# Patient Record
Sex: MALE | Race: OTHER | HISPANIC OR LATINO | URBAN - METROPOLITAN AREA
[De-identification: names, ages, dates, MRNs, and addresses within clinical notes are randomized per-mention and may not be internally consistent; named-entity substitution may affect disease eponyms.]

---

## 2023-03-30 RX ORDER — CHLORHEXIDINE GLUCONATE 213 G/1000ML
1 SOLUTION TOPICAL ONCE
Refills: 0 | Status: DISCONTINUED | OUTPATIENT
Start: 2023-03-31 | End: 2023-04-01

## 2023-03-31 ENCOUNTER — INPATIENT (INPATIENT)
Facility: HOSPITAL | Age: 53
LOS: 0 days | Discharge: HOME | DRG: 247 | End: 2023-04-01
Attending: INTERNAL MEDICINE | Admitting: INTERNAL MEDICINE
Payer: COMMERCIAL

## 2023-03-31 VITALS
TEMPERATURE: 98 F | DIASTOLIC BLOOD PRESSURE: 83 MMHG | OXYGEN SATURATION: 97 % | RESPIRATION RATE: 15 BRPM | SYSTOLIC BLOOD PRESSURE: 125 MMHG | HEART RATE: 65 BPM

## 2023-03-31 DIAGNOSIS — R06.02 SHORTNESS OF BREATH: ICD-10-CM

## 2023-03-31 LAB
ANION GAP SERPL CALC-SCNC: 11 MMOL/L — SIGNIFICANT CHANGE UP (ref 5–17)
BASOPHILS # BLD AUTO: 0.05 K/UL — SIGNIFICANT CHANGE UP (ref 0–0.2)
BASOPHILS NFR BLD AUTO: 1 % — SIGNIFICANT CHANGE UP (ref 0–2)
BUN SERPL-MCNC: 19.4 MG/DL — SIGNIFICANT CHANGE UP (ref 8–20)
CALCIUM SERPL-MCNC: 9.2 MG/DL — SIGNIFICANT CHANGE UP (ref 8.4–10.5)
CHLORIDE SERPL-SCNC: 101 MMOL/L — SIGNIFICANT CHANGE UP (ref 96–108)
CHOLEST SERPL-MCNC: 307 MG/DL — HIGH
CO2 SERPL-SCNC: 24 MMOL/L — SIGNIFICANT CHANGE UP (ref 22–29)
CREAT SERPL-MCNC: 0.94 MG/DL — SIGNIFICANT CHANGE UP (ref 0.5–1.3)
EGFR: 96 ML/MIN/1.73M2 — SIGNIFICANT CHANGE UP
EOSINOPHIL # BLD AUTO: 0.08 K/UL — SIGNIFICANT CHANGE UP (ref 0–0.5)
EOSINOPHIL NFR BLD AUTO: 1.7 % — SIGNIFICANT CHANGE UP (ref 0–6)
GLUCOSE SERPL-MCNC: 233 MG/DL — HIGH (ref 70–99)
HCT VFR BLD CALC: 43.6 % — SIGNIFICANT CHANGE UP (ref 39–50)
HDLC SERPL-MCNC: 30 MG/DL — LOW
HGB BLD-MCNC: 14.8 G/DL — SIGNIFICANT CHANGE UP (ref 13–17)
IMM GRANULOCYTES NFR BLD AUTO: 1.5 % — HIGH (ref 0–0.9)
LIPID PNL WITH DIRECT LDL SERPL: SIGNIFICANT CHANGE UP MG/DL
LYMPHOCYTES # BLD AUTO: 2 K/UL — SIGNIFICANT CHANGE UP (ref 1–3.3)
LYMPHOCYTES # BLD AUTO: 41.8 % — SIGNIFICANT CHANGE UP (ref 13–44)
MAGNESIUM SERPL-MCNC: 1.7 MG/DL — SIGNIFICANT CHANGE UP (ref 1.6–2.6)
MCHC RBC-ENTMCNC: 28.5 PG — SIGNIFICANT CHANGE UP (ref 27–34)
MCHC RBC-ENTMCNC: 33.9 GM/DL — SIGNIFICANT CHANGE UP (ref 32–36)
MCV RBC AUTO: 83.8 FL — SIGNIFICANT CHANGE UP (ref 80–100)
MONOCYTES # BLD AUTO: 0.4 K/UL — SIGNIFICANT CHANGE UP (ref 0–0.9)
MONOCYTES NFR BLD AUTO: 8.4 % — SIGNIFICANT CHANGE UP (ref 2–14)
NEUTROPHILS # BLD AUTO: 2.18 K/UL — SIGNIFICANT CHANGE UP (ref 1.8–7.4)
NEUTROPHILS NFR BLD AUTO: 45.6 % — SIGNIFICANT CHANGE UP (ref 43–77)
NON HDL CHOLESTEROL: 277 MG/DL — HIGH
PLATELET # BLD AUTO: 135 K/UL — LOW (ref 150–400)
POTASSIUM SERPL-MCNC: 5 MMOL/L — SIGNIFICANT CHANGE UP (ref 3.5–5.3)
POTASSIUM SERPL-SCNC: 5 MMOL/L — SIGNIFICANT CHANGE UP (ref 3.5–5.3)
RBC # BLD: 5.2 M/UL — SIGNIFICANT CHANGE UP (ref 4.2–5.8)
RBC # FLD: 12.9 % — SIGNIFICANT CHANGE UP (ref 10.3–14.5)
SODIUM SERPL-SCNC: 136 MMOL/L — SIGNIFICANT CHANGE UP (ref 135–145)
TRIGL SERPL-MCNC: 785 MG/DL — HIGH
WBC # BLD: 4.78 K/UL — SIGNIFICANT CHANGE UP (ref 3.8–10.5)
WBC # FLD AUTO: 4.78 K/UL — SIGNIFICANT CHANGE UP (ref 3.8–10.5)

## 2023-03-31 PROCEDURE — 93010 ELECTROCARDIOGRAM REPORT: CPT

## 2023-03-31 RX ORDER — ASPIRIN/CALCIUM CARB/MAGNESIUM 324 MG
81 TABLET ORAL DAILY
Refills: 0 | Status: DISCONTINUED | OUTPATIENT
Start: 2023-04-01 | End: 2023-04-01

## 2023-03-31 RX ORDER — SACUBITRIL AND VALSARTAN 24; 26 MG/1; MG/1
1 TABLET, FILM COATED ORAL
Refills: 0 | Status: DISCONTINUED | OUTPATIENT
Start: 2023-03-31 | End: 2023-03-31

## 2023-03-31 RX ORDER — ATORVASTATIN CALCIUM 80 MG/1
40 TABLET, FILM COATED ORAL AT BEDTIME
Refills: 0 | Status: DISCONTINUED | OUTPATIENT
Start: 2023-03-31 | End: 2023-03-31

## 2023-03-31 RX ORDER — MAGNESIUM OXIDE 400 MG ORAL TABLET 241.3 MG
400 TABLET ORAL ONCE
Refills: 0 | Status: COMPLETED | OUTPATIENT
Start: 2023-03-31 | End: 2023-03-31

## 2023-03-31 RX ORDER — TICAGRELOR 90 MG/1
90 TABLET ORAL EVERY 12 HOURS
Refills: 0 | Status: DISCONTINUED | OUTPATIENT
Start: 2023-03-31 | End: 2023-04-01

## 2023-03-31 RX ORDER — FENOFIBRATE,MICRONIZED 130 MG
145 CAPSULE ORAL DAILY
Refills: 0 | Status: DISCONTINUED | OUTPATIENT
Start: 2023-03-31 | End: 2023-03-31

## 2023-03-31 RX ORDER — SACUBITRIL AND VALSARTAN 24; 26 MG/1; MG/1
1 TABLET, FILM COATED ORAL
Qty: 180 | Refills: 3
Start: 2023-03-31 | End: 2024-03-24

## 2023-03-31 RX ORDER — OMEGA-3 ACID ETHYL ESTERS 1 G
2 CAPSULE ORAL
Refills: 0 | DISCHARGE

## 2023-03-31 RX ORDER — TICAGRELOR 90 MG/1
1 TABLET ORAL
Qty: 180 | Refills: 3
Start: 2023-03-31 | End: 2024-03-24

## 2023-03-31 RX ORDER — ASPIRIN/CALCIUM CARB/MAGNESIUM 324 MG
1 TABLET ORAL
Refills: 0 | DISCHARGE

## 2023-03-31 RX ORDER — FENOFIBRATE,MICRONIZED 130 MG
48 CAPSULE ORAL DAILY
Refills: 0 | Status: DISCONTINUED | OUTPATIENT
Start: 2023-04-01 | End: 2023-04-01

## 2023-03-31 RX ORDER — TICAGRELOR 90 MG/1
1 TABLET ORAL
Qty: 90 | Refills: 3
Start: 2023-03-31

## 2023-03-31 RX ORDER — CARVEDILOL PHOSPHATE 80 MG/1
3.12 CAPSULE, EXTENDED RELEASE ORAL EVERY 12 HOURS
Refills: 0 | Status: DISCONTINUED | OUTPATIENT
Start: 2023-03-31 | End: 2023-04-01

## 2023-03-31 RX ORDER — SACUBITRIL AND VALSARTAN 24; 26 MG/1; MG/1
1 TABLET, FILM COATED ORAL
Qty: 90 | Refills: 3
Start: 2023-03-31

## 2023-03-31 RX ORDER — SACUBITRIL AND VALSARTAN 24; 26 MG/1; MG/1
1 TABLET, FILM COATED ORAL
Refills: 0 | Status: DISCONTINUED | OUTPATIENT
Start: 2023-03-31 | End: 2023-04-01

## 2023-03-31 RX ORDER — ATORVASTATIN CALCIUM 80 MG/1
80 TABLET, FILM COATED ORAL AT BEDTIME
Refills: 0 | Status: DISCONTINUED | OUTPATIENT
Start: 2023-03-31 | End: 2023-03-31

## 2023-03-31 RX ORDER — ROSUVASTATIN CALCIUM 5 MG/1
40 TABLET ORAL AT BEDTIME
Refills: 0 | Status: DISCONTINUED | OUTPATIENT
Start: 2023-03-31 | End: 2023-04-01

## 2023-03-31 RX ADMIN — Medication 145 MILLIGRAM(S): at 16:13

## 2023-03-31 RX ADMIN — CARVEDILOL PHOSPHATE 3.12 MILLIGRAM(S): 80 CAPSULE, EXTENDED RELEASE ORAL at 18:59

## 2023-03-31 RX ADMIN — TICAGRELOR 90 MILLIGRAM(S): 90 TABLET ORAL at 21:05

## 2023-03-31 RX ADMIN — ROSUVASTATIN CALCIUM 40 MILLIGRAM(S): 5 TABLET ORAL at 21:05

## 2023-03-31 RX ADMIN — MAGNESIUM OXIDE 400 MG ORAL TABLET 400 MILLIGRAM(S): 241.3 TABLET ORAL at 16:13

## 2023-03-31 RX ADMIN — SACUBITRIL AND VALSARTAN 1 TABLET(S): 24; 26 TABLET, FILM COATED ORAL at 21:05

## 2023-03-31 NOTE — DISCHARGE NOTE PROVIDER - HOSPITAL COURSE
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 He denies chest pain, palpitations, orthopnea or PND. He is unable to exercise of a treadmill due to back pain. He was told his BP was elevated in [place], however it was reportedly stable at his PMD's office. He had a TTE 3/8/23 revealing EF 30-35%; moderate diffuse hypocontractility of the LV; akinesis of the entire lateral, inferolateral and inferior myocardium; RV ventricular systolic function is normal; mitral valve leaflets are mildly thickened; moderate 2-3+ MVR; mildly thickened AV leaflets; mild 1+ AVR; trace TVR; mild +1 PVR. He underwent a PET/ CT myocardial perfusion scan 3/23/23 revealing negative stress ekg; During stress global systolic function is severely reduced EF 20%; at rest EF calculated at 20%; small area of mild ischemia involving the mid to basal inferior and inferolateral wall; coronary calcification is noted involving the Cx. Patient presents to [facility] CCL for elective LHC +/- PCI.    Assessment of LVEF (Must be within 6 months):       EF: 30-35%       Assessed by: TTE       Date: 3/8/23    Prior Cardiac Interventions:       PCI's (Date, Stents, Vessels): n/a       CABG (Date, Grafts): n/a    Noninvasive Testing:   Stress Test: Date: 3/23/23       Protocol: a PET/ CT myocardial perfusion scan        Symptoms: none       EKG Changes: none       Myocardial Imaging:  During stress global systolic function is severely reduced EF 20%; at rest EF calculated at 20%; small area of mild ischemia involving the mid to basal inferior and inferolateral wall; coronary calcification is noted involving the Cx    Echo (Date, Findings): a TTE 3/8/23 revealing EF 30-35%; moderate diffuse hypocontractility of the LV; akinesis of the entire lateral, inferolateral and inferior myocardium; RV ventricular systolic function is normal; mitral valve leaflets are mildly thickened; moderate 2-3+ MVR; mildly thickened AV leaflets; mild 1+ AVR; trace TVR; mild +1 PVR.      Now s/p LHC via RRA with Dr. Jayesh Crowder, pt tolerated procedure well. Pt arrived to recovery in NAD and HDS, RRA access site stable with right radial band in place, no bleed/hematoma, distal pulse +2, RUE/ right hand remains acyanotic; warm to touch; motor/sensory function intact  Intraprocedurally: Lidocaine 1% 5ml; Midazolam 1mg IV; fentanyl 50mcg IV; Verapamil 5mg IA; heparin 4,000 units IA; Heparin 7,000 units IV; Nitroglycerin 200mcg IC; NS bolus 100ml; Brilinta 180mg PO x1; Omnipaque 208ml  Findings:  Severe LCX/OM disease with non obstructive CAD of LAD and RCA.  The LCX/OM was treated with PTCA and STENT (MERI-Xience) with IVUS (mini crush technique).   First obtuse marginal: The initial stenosis was 90 %. s/p MERI x 1 (XIENCE SKYPOINT 2.75 X 28MM).  Mid circumflex: The initial stenosis was 85 %. s/p MERI x 1 (XIENCE SKYPOINT 3X 23MM)  Proximal circumflex: The initial stenosis was 80 % s/p 1 MERI (XIENCE SKYPOINT 4.0 X 28MM)  Post Cath EKG: SR with PVCs    Plan:  -Post procedure management/monitoring per protocol  -Access site precautions  -Radial compression band removal at 1630 (if RRA site stable w/o active bleeding or hematoma)  -Bedrest x 3 hours post procedure (if RRA site stable, ok to ambulate in 3 hours at 1800)  -Labs and EKG in am  -NS 0.9% 250ml/hr x 1 bolus HELD DUE TO LOW EF  -Repeat ECG if any clinical indication or change on tele  -s/p Brilinta load 180mg PO x1   -Dual anti platelet therapy with aspirin 81mg and Brilinta 90mg BID  -Start Carvedilol 3.125mg PO BID  -D/C home amlodipine  -start Entresto 24-26mg Po BID  -Cont statin therapy with Lipitor 40mg po qHS   -Start Tricor 145mg daily  -Educated regarding strict adherence with DAPT   -Educated regarding post procedure management and care  -Discussed the importance of RF modification  -Cardiac rehab info provided/referral and communication to cardiac rehab completed  -F/U outpt in 1-2 weeks with Cardiologist Dr. Gallo  -Dr. Crowder wants Lifevest prior to discharge. [employer] contacted and paperwork sent.   -DISPO: *** Plan for D/C in am if remains HDS, ECG and labs in am stable and without complications and patient had life vest device    1. CAD:  GDMT:        DAPT: Aspirin 81mg daily and Brilinta 90mg PO BID       Statin: Start Atorvastatin 40mg PO QHS; Tricor 145mg PO daily       Beta Blocker: Start Carvedilol 3.125mg PO BID       Aggressive cardiovascular risk reduction and lifestyle modification.  Cardiac rehab info provided/referral and communication to cardiac rehab completed    2. HFrEF:  GDMT       Beta Blocker: Start Carvedilol 3.125mg PO BID       RAAS Inhibitor: Start Entresto 24-26mg PO BID       Other:   Strict I&O's  Daily standing weights (if able)  Life vest prior to D/C  [employer] Contacted and Paper work faxed [phone]    3. HTN:  Beta Blocker: Start Carvedilol 3.125mg PO BID  RAASi: Start Entresto 24-26mg PO BID  CCB: D/C amlodipine    4. HLD:  Goal Non-HDL < 100, LDL < 70 NOT AT GOAL  Lipid Panel: Lipid Profile (03.31.23 @ 10:22)    Cholesterol, Serum: 307 mg/dL    Triglycerides, Serum: 785: Lipemic. Interpret with caution mg/dL    HDL Cholesterol, Serum: 30 mg/dL    Non HDL Cholesterol: 277: Patients Atherosclerotic Cardiovascular Disease (ASCVD) Risk  Optimal Level (mg/dL)    LDL Cholesterol Calculated: Unable to Perform Calculation Due to high Triglyceride Cannot calculate due to Triglyceride is >400 mg/dL. 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 He denies chest pain, palpitations, orthopnea or PND. He is unable to exercise of a treadmill due to back pain. He was told his BP was elevated in [place], however it was reportedly stable at his PMD's office. He had a TTE 3/8/23 revealing EF 30-35%; moderate diffuse hypocontractility of the LV; akinesis of the entire lateral, inferolateral and inferior myocardium; RV ventricular systolic function is normal; mitral valve leaflets are mildly thickened; moderate 2-3+ MVR; mildly thickened AV leaflets; mild 1+ AVR; trace TVR; mild +1 PVR. He underwent a PET/ CT myocardial perfusion scan 3/23/23 revealing negative stress ekg; During stress global systolic function is severely reduced EF 20%; at rest EF calculated at 20%; small area of mild ischemia involving the mid to basal inferior and inferolateral wall; coronary calcification is noted involving the Cx. Patient presents to [facility] CCL for elective LHC +/- PCI.    Assessment of LVEF (Must be within 6 months):       EF: 30-35%       Assessed by: TTE       Date: 3/8/23    Prior Cardiac Interventions:       PCI's (Date, Stents, Vessels): n/a       CABG (Date, Grafts): n/a    Noninvasive Testing:   Stress Test: Date: 3/23/23       Protocol: a PET/ CT myocardial perfusion scan        Symptoms: none       EKG Changes: none       Myocardial Imaging:  During stress global systolic function is severely reduced EF 20%; at rest EF calculated at 20%; small area of mild ischemia involving the mid to basal inferior and inferolateral wall; coronary calcification is noted involving the Cx    Echo (Date, Findings): a TTE 3/8/23 revealing EF 30-35%; moderate diffuse hypocontractility of the LV; akinesis of the entire lateral, inferolateral and inferior myocardium; RV ventricular systolic function is normal; mitral valve leaflets are mildly thickened; moderate 2-3+ MVR; mildly thickened AV leaflets; mild 1+ AVR; trace TVR; mild +1 PVR.      Now s/p LHC via RRA with Dr. Jayesh Crowder, pt tolerated procedure well. Pt arrived to recovery in NAD and HDS, RRA access site stable with right radial band in place, no bleed/hematoma, distal pulse +2, RUE/ right hand remains acyanotic; warm to touch; motor/sensory function intact  Intraprocedurally: Lidocaine 1% 5ml; Midazolam 1mg IV; fentanyl 50mcg IV; Verapamil 5mg IA; heparin 4,000 units IA; Heparin 7,000 units IV; Nitroglycerin 200mcg IC; NS bolus 100ml; Brilinta 180mg PO x1; Omnipaque 208ml  Findings:  Severe LCX/OM disease with non obstructive CAD of LAD and RCA.  The LCX/OM was treated with PTCA and STENT (MERI-Xience) with IVUS (mini crush technique).   First obtuse marginal: The initial stenosis was 90 %. s/p MERI x 1 (XIENCE SKYPOINT 2.75 X 28MM).  Mid circumflex: The initial stenosis was 85 %. s/p MERI x 1 (XIENCE SKYPOINT 3X 23MM)  Proximal circumflex: The initial stenosis was 80 % s/p 1 MERI (XIENCE SKYPOINT 4.0 X 28MM)  Post Cath EKG: SR with PVCs    Plan:  -Post procedure management/monitoring per protocol  -Access site precautions  -Radial compression band removal at 1630 (if RRA site stable w/o active bleeding or hematoma)  -Bedrest x 3 hours post procedure (if RRA site stable, ok to ambulate in 3 hours at 1800)  -Labs and EKG in am  -NS 0.9% 250ml/hr x 1 bolus HELD DUE TO LOW EF  -Repeat ECG if any clinical indication or change on tele  -s/p Brilinta load 180mg PO x1   -Dual anti platelet therapy with aspirin 81mg and Brilinta 90mg BID  -Start Carvedilol 3.125mg PO BID  -D/C home amlodipine  -start Entresto 24-26mg Po BID  -Start statin therapy with Rosuvastatin 40mg po qHS   -Start Tricor 48mg oral daily  -Educated regarding strict adherence with DAPT   -Educated regarding post procedure management and care  -Discussed the importance of RF modification  -Cardiac rehab info provided/referral and communication to cardiac rehab completed  -F/U outpt in 1-2 weeks with Cardiologist Dr. Gallo  -Dr. Crowder wants Lifevest prior to discharge. [employer] contacted and paperwork sent.   -DISPO: Plan for D/C in am if remains HDS, ECG and labs in am stable and without complications and patient obtains lifevest     1. CAD:  GDMT:        DAPT: Aspirin 81mg daily and Brilinta 90mg PO BID       Statin: Start Rosuvastatin 40mg PO QHS; Tricor 48mg PO daily       Beta Blocker: Start Carvedilol 3.125mg PO BID       Aggressive cardiovascular risk reduction and lifestyle modification.  Cardiac rehab info provided/referral and communication to cardiac rehab completed    2. HFrEF:  GDMT       Beta Blocker: Start Carvedilol 3.125mg PO BID       RAAS Inhibitor: Start Entresto 24-26mg PO BID       Other:   Strict I&O's  Daily standing weights (if able)  Life vest prior to D/C  [employer] Contacted and Paper work faxed [phone]    3. HTN:  Beta Blocker: Start Carvedilol 3.125mg PO BID  RAASi: Start Entresto 24-26mg PO BID  CCB: D/C amlodipine    4. HLD:  Goal Non-HDL < 100, LDL < 70 NOT AT GOAL  Lipid Panel: Lipid Profile (03.31.23 @ 10:22)    Cholesterol, Serum: 307 mg/dL    Triglycerides, Serum: 785: Lipemic. Interpret with caution mg/dL    HDL Cholesterol, Serum: 30 mg/dL    Non HDL Cholesterol: 277: Patients Atherosclerotic Cardiovascular Disease (ASCVD) Risk  Optimal Level (mg/dL)    LDL Cholesterol Calculated: Unable to Perform Calculation Due to high Triglyceride Cannot calculate due to Triglyceride is >400 mg/dL. 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 He denies chest pain, palpitations, orthopnea or PND. He is unable to exercise of a treadmill due to back pain. He was told his BP was elevated in [place], however it was reportedly stable at his PMD's office. He had a TTE 3/8/23 revealing EF 30-35%; moderate diffuse hypocontractility of the LV; akinesis of the entire lateral, inferolateral and inferior myocardium; RV ventricular systolic function is normal; mitral valve leaflets are mildly thickened; moderate 2-3+ MVR; mildly thickened AV leaflets; mild 1+ AVR; trace TVR; mild +1 PVR. He underwent a PET/ CT myocardial perfusion scan 3/23/23 revealing negative stress ekg; During stress global systolic function is severely reduced EF 20%; at rest EF calculated at 20%; small area of mild ischemia involving the mid to basal inferior and inferolateral wall; coronary calcification is noted involving the Cx. Patient presents to [facility] CCL for elective LHC +/- PCI    Now  POD#1 s/p LHC via RRA with Dr. Jayesh Crowder  Findings:  Severe LCX/OM disease with non obstructive CAD of LAD and RCA.  The LCX/OM was treated with PTCA and STENT (MERI-Xience) with IVUS (mini crush technique).   First obtuse marginal: The initial stenosis was 90 %. s/p MERI x 1 (XIENCE SKYPOINT 2.75 X 28MM).  Mid circumflex: The initial stenosis was 85 %. s/p MERI x 1 (XIENCE SKYPOINT 3X 23MM)  Proximal circumflex: The initial stenosis was 80 % s/p 1 MERI (XIENCE SKYPOINT 4.0 X 28MM)    Plan:  -Dual anti platelet therapy with aspirin 81mg and Brilinta 90mg BID  -Start Carvedilol 3.125mg PO BID  -D/C home amlodipine  -start Entresto 24-26mg Po BID  -Start statin therapy with Rosuvastatin 40mg po qHS   -Start Tricor 48mg oral daily  -Lifevest fitted prior to discharge  -F/U outpt in 1-2 weeks with Cardiologist Dr. Gallo

## 2023-03-31 NOTE — H&P PST ADULT - ASSESSMENT
Impression:  52M PMH HTN; family hx CAD (father passed at 70; mother passed at 65; cousin passed at 60) with complaints of SOB, worsened when lifting heavy  boxes at work. He denies chest pain, palpitations, orthopnea or PND. He is unable to exercise of a treadmill due to back pain. He was told his BP was elevated in [place], however it was reportedly stable at his PMD's office. He had a TTE 3/8/23 revealing EF 30-35%; moderate diffuse hypocontractility of the LV; akinesis of the entire lateral, inferolateral and inferior myocardium; RV ventricular systolic function is normal; mitral valve leaflets are mildly thickened; moderate 2-3+ MVR; mildly thickened AV leaflets; mild 1+ AVR; trace TVR; mild +1 PVR. He underwent a PET/ CT myocardial perfusion scan 3/23/23 revealing negative stress ekg; During stress global systolic function is severely reduced EF 20%; at rest EF calculated at 20%; small area of mild ischemia involving the mid to basal inferior and inferolateral wall; coronary calcification is noted involving the Cx. Patient presents to [facility] CCL for elective LHC +/- PCI.    Risk Assessments:  ASA:  Mallampati:  GFR:   Cr:  BRA:    Plan:  -plan for LHC  -Preferred Access RRA versus RFA  -patient seen and examined  -confirmed appropriate NPO duration  -ECG and Labs reviewed  -Aspirin 81mg po pre-cath  -procedure discussed with patient; risks and benefits explained, questions answered  -consent obtained by attending IC  -0.9% NS bolus held due to HFrEF 30-35%   Impression:  52M PMH HTN; family hx CAD (father passed at 70; mother passed at 65; cousin passed at 60) with complaints of SOB, worsened when lifting heavy  boxes at work. He denies chest pain, palpitations, orthopnea or PND. He is unable to exercise of a treadmill due to back pain. He was told his BP was elevated in [place], however it was reportedly stable at his PMD's office. He had a TTE 3/8/23 revealing EF 30-35%; moderate diffuse hypocontractility of the LV; akinesis of the entire lateral, inferolateral and inferior myocardium; RV ventricular systolic function is normal; mitral valve leaflets are mildly thickened; moderate 2-3+ MVR; mildly thickened AV leaflets; mild 1+ AVR; trace TVR; mild +1 PVR. He underwent a PET/ CT myocardial perfusion scan 3/23/23 revealing negative stress ekg; During stress global systolic function is severely reduced EF 20%; at rest EF calculated at 20%; small area of mild ischemia involving the mid to basal inferior and inferolateral wall; coronary calcification is noted involving the Cx. Patient presents to [facility] CCL for elective LHC +/- PCI.    Risk Assessments:  ASA: 3  Mallampati: 2  GFR: 96  Cr: 0.94  BRA: 1.0%    Plan:  -plan for LHC  -Preferred Access RRA versus RFA  -patient seen and examined  -confirmed appropriate NPO duration  -ECG and Labs reviewed  -Aspirin 81mg po pre-cath (patient took prior to arrival)  -procedure discussed with patient; risks and benefits explained, questions answered  -consent obtained by attending IC  -0.9% NS bolus held due to HFrEF 30-35%

## 2023-03-31 NOTE — H&P PST ADULT - HISTORY OF PRESENT ILLNESS
52M PMH HTN; family hx CAD (father passed at 70; mother passed at 65; cousin passed at 60) with complaints of SOB, worsened when lifting heavy  boxes at work. He denies chest pain, palpitations, orthopnea or PND. He is unable to exercise of a treadmill due to back pain. He was told his BP was elevated in [place], however it was reportedly stable at his PMD's office. He had a TTE 3/8/23 revealing EF 30-35%; moderate diffuse hypocontractility of the LV; akinesis of the entire lateral, inferolateral and inferior myocardium; RV ventricular systolic function is normal; mitral valve leaflets are mildly thickened; moderate 2-3+ MVR; mildly thickened AV leaflets; mild 1+ AVR; trace TVR; mild +1 PVR. He underwent a PET/ CT myocardial perfusion scan 3/23/23 revealing negative stress ekg; During stress global systolic function is severely reduced EF 20%; at rest EF calculated at 20%; small area of mild ischemia involving the mid to basal inferior and inferolateral wall; coronary calcification is noted involving the Cx. Patient presents to [facility] for elective LHC +/- PCI.    Symptoms:        Angina (Class): II       Ischemic Symptoms: DOUGLASS    Heart Failure:        Systolic/Diastolic/Combined: Systolic       NYHA Class (within 2 weeks): III    Assessment of LVEF (Must be within 6 months):       EF: 30-35%       Assessed by: TTE       Date: 3/8/23    Prior Cardiac Interventions:       PCI's (Date, Stents, Vessels): n/a       CABG (Date, Grafts): n/a    Noninvasive Testing:   Stress Test: Date: 3/23/23       Protocol: a PET/ CT myocardial perfusion scan        Symptoms: none       EKG Changes: none       Myocardial Imaging:  During stress global systolic function is severely reduced EF 20%; at rest EF calculated at 20%; small area of mild ischemia involving the mid to basal inferior and inferolateral wall; coronary calcification is noted involving the Cx    Echo (Date, Findings): a TTE 3/8/23 revealing EF 30-35%; moderate diffuse hypocontractility of the LV; akinesis of the entire lateral, inferolateral and inferior myocardium; RV ventricular systolic function is normal; mitral valve leaflets are mildly thickened; moderate 2-3+ MVR; mildly thickened AV leaflets; mild 1+ AVR; trace TVR; mild +1 PVR.    Antianginal Therapies:        Beta Blockers:  n/a       Calcium Channel Blockers: Amlodipine 5mg daily       Long Acting Nitrates: n/a       Ranexa: n/a      Associated Risk Factors:        Cerebrovascular Disease: N/A       Chronic Lung Disease: N/A       Peripheral Arterial Disease: N/A       Chronic Kidney Disease (if yes, what is GFR): N/A       Uncontrolled Diabetes (if yes, what is HgbA1C or FBS): N/A       Poorly Controlled Hypertension (if yes, what is SBP): N/A       Morbid Obesity (if yes, what is BMI): N/A       History of Recent Ventricular Arrhythmia: N/A       Inability to Ambulate Safely: N/A       Need for Therapeutic Anticoagulation: N/A       Antiplatelet or Contrast Allergy: N/A     52M PMH HTN; patient reports hx CVA 6 years ago in [place] (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 He denies chest pain, palpitations, orthopnea or PND. He is unable to exercise of a treadmill due to back pain. He was told his BP was elevated in [place], however it was reportedly stable at his PMD's office. He had a TTE 3/8/23 revealing EF 30-35%; moderate diffuse hypocontractility of the LV; akinesis of the entire lateral, inferolateral and inferior myocardium; RV ventricular systolic function is normal; mitral valve leaflets are mildly thickened; moderate 2-3+ MVR; mildly thickened AV leaflets; mild 1+ AVR; trace TVR; mild +1 PVR. He underwent a PET/ CT myocardial perfusion scan 3/23/23 revealing negative stress ekg; During stress global systolic function is severely reduced EF 20%; at rest EF calculated at 20%; small area of mild ischemia involving the mid to basal inferior and inferolateral wall; coronary calcification is noted involving the Cx. Patient presents to [facility] for elective LHC +/- PCI.    Symptoms:        Angina (Class): II       Ischemic Symptoms: DOUGLASS    Heart Failure:        Systolic/Diastolic/Combined: Systolic       NYHA Class (within 2 weeks): III    Assessment of LVEF (Must be within 6 months):       EF: 30-35%       Assessed by: TTE       Date: 3/8/23    Prior Cardiac Interventions:       PCI's (Date, Stents, Vessels): n/a       CABG (Date, Grafts): n/a    Noninvasive Testing:   Stress Test: Date: 3/23/23       Protocol: a PET/ CT myocardial perfusion scan        Symptoms: none       EKG Changes: none       Myocardial Imaging:  During stress global systolic function is severely reduced EF 20%; at rest EF calculated at 20%; small area of mild ischemia involving the mid to basal inferior and inferolateral wall; coronary calcification is noted involving the Cx    Echo (Date, Findings): a TTE 3/8/23 revealing EF 30-35%; moderate diffuse hypocontractility of the LV; akinesis of the entire lateral, inferolateral and inferior myocardium; RV ventricular systolic function is normal; mitral valve leaflets are mildly thickened; moderate 2-3+ MVR; mildly thickened AV leaflets; mild 1+ AVR; trace TVR; mild +1 PVR.    Antianginal Therapies:        Beta Blockers:  n/a       Calcium Channel Blockers: Amlodipine 5mg daily       Long Acting Nitrates: n/a       Ranexa: n/a      Associated Risk Factors:        Cerebrovascular Disease: N/A       Chronic Lung Disease: N/A       Peripheral Arterial Disease: N/A       Chronic Kidney Disease (if yes, what is GFR): N/A       Uncontrolled Diabetes (if yes, what is HgbA1C or FBS): N/A       Poorly Controlled Hypertension (if yes, what is SBP): N/A       Morbid Obesity (if yes, what is BMI): N/A       History of Recent Ventricular Arrhythmia: N/A       Inability to Ambulate Safely: N/A       Need for Therapeutic Anticoagulation: N/A       Antiplatelet or Contrast Allergy: N/A

## 2023-03-31 NOTE — PROGRESS NOTE ADULT - SUBJECTIVE AND OBJECTIVE BOX
I have seen and examined this patient. There is no change since the last H& P. Consent obtained. Agree with cardiac cath. Risks and benefits fully explained. All questions answered.    Jayesh Crowder MD

## 2023-03-31 NOTE — DISCHARGE NOTE PROVIDER - NSDCCPCAREPLAN_GEN_ALL_CORE_FT
PRINCIPAL DISCHARGE DIAGNOSIS  Diagnosis: CAD (coronary artery disease)  Assessment and Plan of Treatment: Coronary artery disease is the buildup of plaque in the arteries that supply oxygen-rich blood to your heart. Plaque causes a narrowing or blockage that could result in a heart attack. Symptoms include chest pain or discomfort, shortness of breath, dizziness, palpitations, fatigue or reduced exercise tolerance. .  Go to the ED with any acute onset of chest pain, palpitations, shortness of breath or dizziness. Do NOT miss a dose or stop taking your Aspirin and Brilinta, these medications keep your stent open and prevent a heart attack. If anyone tells you to stop these medications, speak to your cardiologist immediately.  Managing risk factors will help keep your stent open and prevent future blockages, risk factors may include: high blood pressure, high cholesterol, diabetes, obesity, sedentary life style and smoking.    Your diet should be low in fat, cholesterol, salt and carbohydrates, increase fruits (caution if diabetic), vegetables and whole grains/fiber rich foods.   Take all your cardiac  medications as prescribed.    Exercise is a very important factor in heart health. Once your post procedure restrictions have passed, you should engage in heart healthy, aerobic exercise. Be sure to have clearance from your cardiologist. Cardiac rehab programs could be extremely beneficial and your cardiologist could help set this up.   Follow up with your cardiologist within 1-2 weeks after your procedure.   Call your cardiologist or our unit ([phone]) with any questions or concerns that may arise.      SECONDARY DISCHARGE DIAGNOSES  Diagnosis: HFrEF (heart failure with reduced ejection fraction)  Assessment and Plan of Treatment: GDMT       Beta Blocker: Start Carvedilol 3.125mg PO BID       RAAS Inhibitor: Start Entresto 24-26mg PO BID       Other:   Strict I&O's  Daily standing weights (if able)  Life vest prior to D/C    Diagnosis: HTN (hypertension)  Assessment and Plan of Treatment: Beta Blocker: Start Carvedilol 3.125mg PO BID  RAASi: Start Entresto 24-26mg PO BID  CCB: D/C amlodipine    Diagnosis: HLD (hyperlipidemia)  Assessment and Plan of Treatment: Goal Non-HDL < 100, LDL < 70 NOT AT GOAL  Lipid Panel: Lipid Profile (03.31.23 @ 10:22)    Cholesterol, Serum: 307 mg/dL    Triglycerides, Serum: 785: Lipemic. Interpret with caution mg/dL    HDL Cholesterol, Serum: 30 mg/dL    Non HDL Cholesterol: 277: Patients Atherosclerotic Cardiovascular Disease (ASCVD) Risk  Optimal Level (mg/dL)    LDL Cholesterol Calculated: Unable to Perform Calculation Due to high Triglyceride Cannot calculate due to Triglyceride is >400 mg/dL. mg/dL  Statin: Start Atorvastatin 40mg PO QHS  Other: Start Tricor 145mg PO daily     PRINCIPAL DISCHARGE DIAGNOSIS  Diagnosis: CAD (coronary artery disease)  Assessment and Plan of Treatment: Coronary artery disease is the buildup of plaque in the arteries that supply oxygen-rich blood to your heart. Plaque causes a narrowing or blockage that could result in a heart attack. Symptoms include chest pain or discomfort, shortness of breath, dizziness, palpitations, fatigue or reduced exercise tolerance. .  Go to the ED with any acute onset of chest pain, palpitations, shortness of breath or dizziness. Do NOT miss a dose or stop taking your Aspirin and Brilinta, these medications keep your stent open and prevent a heart attack. If anyone tells you to stop these medications, speak to your cardiologist immediately.  Managing risk factors will help keep your stent open and prevent future blockages, risk factors may include: high blood pressure, high cholesterol, diabetes, obesity, sedentary life style and smoking.    Your diet should be low in fat, cholesterol, salt and carbohydrates, increase fruits (caution if diabetic), vegetables and whole grains/fiber rich foods.   Take all your cardiac  medications as prescribed.    Exercise is a very important factor in heart health. Once your post procedure restrictions have passed, you should engage in heart healthy, aerobic exercise. Be sure to have clearance from your cardiologist. Cardiac rehab programs could be extremely beneficial and your cardiologist could help set this up.   Follow up with your cardiologist within 1-2 weeks after your procedure.   Call your cardiologist or our unit ([phone]) with any questions or concerns that may arise.      SECONDARY DISCHARGE DIAGNOSES  Diagnosis: HFrEF (heart failure with reduced ejection fraction)  Assessment and Plan of Treatment: GDMT       Beta Blocker: Start Carvedilol 3.125mg PO BID       RAAS Inhibitor: Start Entresto 24-26mg PO BID       Other:   Strict I&O's  Daily standing weights (if able)  Life vest prior to D/C    Diagnosis: HTN (hypertension)  Assessment and Plan of Treatment: Beta Blocker: Start Carvedilol 3.125mg PO BID  RAASi: Start Entresto 24-26mg PO BID  CCB: D/C amlodipine    Diagnosis: HLD (hyperlipidemia)  Assessment and Plan of Treatment: Goal Non-HDL < 100, LDL < 70 NOT AT GOAL  Lipid Panel: Lipid Profile (03.31.23 @ 10:22)    Cholesterol, Serum: 307 mg/dL    Triglycerides, Serum: 785: Lipemic. Interpret with caution mg/dL    HDL Cholesterol, Serum: 30 mg/dL    Non HDL Cholesterol: 277: Patients Atherosclerotic Cardiovascular Disease (ASCVD) Risk  Optimal Level (mg/dL)    LDL Cholesterol Calculated: Unable to Perform Calculation Due to high Triglyceride Cannot calculate due to Triglyceride is >400 mg/dL. mg/dL  Statin: Start Rosuvastatin 40mg PO QHS  Other: Start Tricor 48mg PO daily

## 2023-03-31 NOTE — DISCHARGE NOTE PROVIDER - CARE PROVIDER_API CALL
Dimitri Gallo (DO)  Cardiovascular Disease; Internal Medicine  [address] [place] [address]  Phone: [phone]  Fax: [phone]  Established Patient  Follow Up Time: 1 week

## 2023-03-31 NOTE — DISCHARGE NOTE PROVIDER - NSDCCPTREATMENT_GEN_ALL_CORE_FT
PRINCIPAL PROCEDURE  Procedure: Coronary stent placement  Findings and Treatment: -You had a cardiac catheterization with Dr. Jayesh Crowder on 3/31/23. Dr. Mcconnell placed a total of 3 stents in your heart. 2 stents to the Circumflex artery and 1 stent to the Obtuse Marginal Artery.   Dr Crowder accessed your right radial artery during the procedure. That is the artery in your right wrist.   -Please continue to monitor your right wrist when you go home. If you develop any bleeding, lay down where you are and apply direct firm pressure until the bleeding stops. If the bleeding is excessive, please call 911.   -If heavy bleeding or large lumps form, hold pressure at the spot and come to the Emergency Room.  -No submerging the arm in water for 48 hours. This includes hot tubs, swimming pools and jacuzzis.  You may start showering on Saturday April 1st. Prior to showering, remove dressing from right wrist. Shower with warm water and soap. Pat dry with towel. You may place a bandaid over the site. change the bandaid every day.   -Restricted use with no heavy lifting of affected arm for 48 hours. No heavy lifting greater than 10lbs.  -You may walk indoors/ outdoors as tolerated. No strenuous exercise, gym, sports or heavy lifting x5 days.  -Please return to nearest ED if you develop any fever, chills, drainage from the incision site, or any change of temperature, color, sensation of the affected extremity.  -Call your doctor for any bleeding, swelling, loss of sensation in the hand or fingers, or fingers turning blue.  -Please return to nearest ED if you develop any chest pain, chest pressure, shortness of breath, jaw pain, pain radiating down the arm, palpitations, dizziness, palpitations, abdominal complaints including abdominal pain, nausea and vomiting.   -Please follow up with your cardiologist in 1-2 weeks

## 2023-03-31 NOTE — H&P PST ADULT - ADDITIONAL PE
b/l upper extremities warm to touch; acyanotic; 2+ radial pulses; motor/sensory function intact  b/l LE warm to touch; b/l feet cooler to touch; acyanotic; 1+ DP pulses; motor/sensory function intact
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
## 2023-03-31 NOTE — DISCHARGE NOTE PROVIDER - NPI NUMBER (FOR SYSADMIN USE ONLY) :
[[id number]] Plastic Surgeon Procedure Text (D): After obtaining clear surgical margins the patient was sent to plastics for surgical repair.  The patient understands they will receive post-surgical care and follow-up from the referring physician's office.

## 2023-03-31 NOTE — CHART NOTE - NSCHARTNOTEFT_GEN_A_CORE
Now s/p LHC via RRA with Dr. Jayesh Crowder, pt tolerated procedure well. Pt arrived to recovery in NAD and HDS, RRA access site stable with right radial band in place, no bleed/hematoma, distal pulse +2, RUE/ right hand remains acyanotic; warm to touch; motor/sensory function intact  Intraprocedurally: Lidocaine 1% 5ml; Midazolam 1mg IV; fentanyl 50mcg IV; Verapamil 5mg IA; heparin 4,000 units IA; Heparin 7,000 units IV; Nitroglycerin 200mcg IC; NS bolus 100ml; Brilinta 180mg PO x1; Omnipaque 208ml  Findings:  Severe LCX/OM disease with non obstructive CAD of LAD and RCA.  The LCX/OM was treated with PTCA and STENT (MERI-Xience) with IVUS (mini crush technique).   First obtuse marginal: The initial stenosis was 90 %. s/p MERI x 1 (XIENCE SKYPOINT 2.75 X 28MM).  Mid circumflex: The initial stenosis was 85 %. s/p MERI x 1 (XIENCE SKYPOINT 3X 23MM)  Proximal circumflex: The initial stenosis was 80 % s/p 1 MERI (XIENCE SKYPOINT 4.0 X 28MM)  Post Cath EKG: SR with PVCs    Plan:  -Post procedure management/monitoring per protocol  -Access site precautions  -Radial compression band removal at 1630 (if RRA site stable w/o active bleeding or hematoma)  -Bedrest x 3 hours post procedure (if RRA site stable, ok to ambulate in 3 hours at 1800)  -Labs and EKG in am  -NS 0.9% 250ml/hr x 1 bolus HELD DUE TO LOW EF  -Repeat ECG if any clinical indication or change on tele  -s/p Brilinta load 180mg PO x1   -Dual anti platelet therapy with aspirin 81mg and Brilinta 90mg BID  -Start Carvedilol 3.125mg PO BID  -D/C home amlodipine  -start Entresto 24-26mg Po BID  -Cont statin therapy with Lipitor 40mg po qHS   -Start Tricor 145mg daily  -Educated regarding strict adherence with DAPT   -Educated regarding post procedure management and care  -Discussed the importance of RF modification  -Cardiac rehab info provided/referral and communication to cardiac rehab completed  -F/U outpt in 1-2 weeks with Cardiologist Dr. Gallo  -Dr. Crowder wants Lifevest prior to discharge. [employer] contacted and paperwork sent.   -DISPO: *** Plan for D/C in am if remains HDS, ECG and labs in am stable and without complications and patient had life vest device Now s/p LHC via RRA with Dr. Jayesh Crowder, pt tolerated procedure well. Pt arrived to recovery in NAD and HDS, RRA access site stable with right radial band in place, no bleed/hematoma, distal pulse +2, RUE/ right hand remains acyanotic; warm to touch; motor/sensory function intact  Intraprocedurally: Lidocaine 1% 5ml; Midazolam 1mg IV; fentanyl 50mcg IV; Verapamil 5mg IA; heparin 4,000 units IA; Heparin 7,000 units IV; Nitroglycerin 200mcg IC; NS bolus 100ml; Brilinta 180mg PO x1; Omnipaque 208ml  Findings:  Severe LCX/OM disease with non obstructive CAD of LAD and RCA.  The LCX/OM was treated with PTCA and STENT (MERI-Xience) with IVUS (mini crush technique).   First obtuse marginal: The initial stenosis was 90 %. s/p MERI x 1 (XIENCE SKYPOINT 2.75 X 28MM).  Mid circumflex: The initial stenosis was 85 %. s/p MERI x 1 (XIENCE SKYPOINT 3X 23MM)  Proximal circumflex: The initial stenosis was 80 % s/p 1 MERI (XIENCE SKYPOINT 4.0 X 28MM)  Post Cath EKG: SR with PVCs    Plan:  -Post procedure management/monitoring per protocol  -Access site precautions  -Radial compression band removal at 1630 (if RRA site stable w/o active bleeding or hematoma)  -Bedrest x 3 hours post procedure (if RRA site stable, ok to ambulate in 3 hours at 1800)  -Labs and EKG in am  -NS 0.9% 250ml/hr x 1 bolus HELD DUE TO LOW EF  -Repeat ECG if any clinical indication or change on tele  -s/p Brilinta load 180mg PO x1   -Dual anti platelet therapy with aspirin 81mg and Brilinta 90mg BID  -Start Carvedilol 3.125mg PO BID  -D/C home amlodipine  -start Entresto 24-26mg Po BID  -Cont statin therapy with Lipitor 40mg po qHS   -Start Tricor 145mg daily  -Educated regarding strict adherence with DAPT   -Educated regarding post procedure management and care  -Discussed the importance of RF modification  -Cardiac rehab info provided/referral and communication to cardiac rehab completed  -F/U outpt in 1-2 weeks with Cardiologist Dr. Gallo  -Dr. Crowder wants Lifevest prior to discharge. [employer] contacted and paperwork sent.   -DISPO: *** Plan for D/C in am if remains HDS, ECG and labs in am stable and without complications and patient had life vest device    1. CAD:  GDMT:        DAPT: Aspirin 81mg daily and Brilinta 90mg PO BID       Statin: Start Atorvastatin 40mg PO QHS; Tricor 145mg PO daily       Beta Blocker: Start Carvedilol 3.125mg PO BID       Aggressive cardiovascular risk reduction and lifestyle modification.  Cardiac rehab info provided/referral and communication to cardiac rehab completed    2. HFrEF:  GDMT       Beta Blocker: Start Carvedilol 3.125mg PO BID       RAAS Inhibitor: Start Entresto 24-26mg PO BID       Other:   Strict I&O's  Daily standing weights (if able)  Life vest prior to D/C  [employer] Contacted and Paper work faxed [phone]    3. HTN:  Beta Blocker: Start Carvedilol 3.125mg PO BID  RAASi: Start Entresto 24-26mg PO BID  CCB: D/C amlodipine  Other:    DASH Diet (to lower high blood pressure):  - Try to eat foods rich in potassium, calcium, magnesium, fiber, and protein  - Limit salt (sodium) intake to less than 1,500 mg per day  - Eat vegetables, fruits, and whole grains  - Include fat-free or low-fat dairy products, fish, poultry, beans, nuts, and vegetable oils  - Limit foods that are high in saturated or trans fat, such as fatty meats, full-fat dairy products, and tropical oils such as coconut, palm kernel, and palm oils  - Limit sugar-sweetened beverages and sweets    For more information: https://www.nhlbi.nih.gov/education/dash-eating-plan    4. HLD:  Goal Non-HDL < 100, LDL < 70 NOT AT GOAL  Lipid Panel: Lipid Profile (03.31.23 @ 10:22)    Cholesterol, Serum: 307 mg/dL    Triglycerides, Serum: 785: Lipemic. Interpret with caution mg/dL    HDL Cholesterol, Serum: 30 mg/dL    Non HDL Cholesterol: 277: Patients Atherosclerotic Cardiovascular Disease (ASCVD) Risk  Optimal Level (mg/dL)    LDL Cholesterol Calculated: Unable to Perform Calculation Due to high Triglyceride Cannot calculate due to Triglyceride is >400 mg/dL. mg/dL  Statin: Start Atorvastatin 40mg PO QHS  Other: Start Tricor 145mg PO daily    All above discussed with Dr. Crowder Now s/p LHC via RRA with Dr. Jayesh Crowder, pt tolerated procedure well. Pt arrived to recovery in NAD and HDS, RRA access site stable with right radial band in place, no bleed/hematoma, distal pulse +2, RUE/ right hand remains acyanotic; warm to touch; motor/sensory function intact  Intraprocedurally: Lidocaine 1% 5ml; Midazolam 1mg IV; fentanyl 50mcg IV; Verapamil 5mg IA; heparin 4,000 units IA; Heparin 7,000 units IV; Nitroglycerin 200mcg IC; NS bolus 100ml; Brilinta 180mg PO x1; Omnipaque 208ml  Findings:  Severe LCX/OM disease with non obstructive CAD of LAD and RCA.  The LCX/OM was treated with PTCA and STENT (MERI-Xience) with IVUS (mini crush technique).   First obtuse marginal: The initial stenosis was 90 %. s/p MERI x 1 (XIENCE SKYPOINT 2.75 X 28MM).  Mid circumflex: The initial stenosis was 85 %. s/p MERI x 1 (XIENCE SKYPOINT 3X 23MM)  Proximal circumflex: The initial stenosis was 80 % s/p 1 MERI (XIENCE SKYPOINT 4.0 X 28MM)  Post Cath EKG: SR with PVCs    Plan:  -Post procedure management/monitoring per protocol  -Access site precautions  -Radial compression band removal at 1630 (if RRA site stable w/o active bleeding or hematoma)  -Bedrest x 3 hours post procedure (if RRA site stable, ok to ambulate in 3 hours at 1800)  -Labs and EKG in am  -NS 0.9% 250ml/hr x 1 bolus HELD DUE TO LOW EF  -Repeat ECG if any clinical indication or change on tele  -s/p Brilinta load 180mg PO x1   -Dual anti platelet therapy with aspirin 81mg and Brilinta 90mg BID  -Start Carvedilol 3.125mg PO BID  -D/C home amlodipine  -start Entresto 24-26mg Po BID  -Start Rosuvastatin 40mg PO QHS  -Start Tricor 48mg daily  -Educated regarding strict adherence with DAPT   -Educated regarding post procedure management and care  -Discussed the importance of RF modification  -Cardiac rehab info provided/referral and communication to cardiac rehab completed  -F/U outpt in 1-2 weeks with Cardiologist Dr. Gallo  -Dr. Crowder wants Lifevest prior to discharge. [employer] contacted and paperwork sent.   -DISPO: *** Plan for D/C in am if remains HDS, ECG and labs in am stable and without complications and patient obtains life vest device    1. CAD:  GDMT:        DAPT: Aspirin 81mg daily and Brilinta 90mg PO BID       Statin: Start Rosuvastatin 40mg PO QHS; Tricor 48mg PO daily       Beta Blocker: Start Carvedilol 3.125mg PO BID       Aggressive cardiovascular risk reduction and lifestyle modification.  Cardiac rehab info provided/referral and communication to cardiac rehab completed    2. HFrEF:  GDMT       Beta Blocker: Start Carvedilol 3.125mg PO BID       RAAS Inhibitor: Start Entresto 24-26mg PO BID       Other:   Strict I&O's  Daily standing weights (if able)  Life vest prior to D/C  [employer] Contacted and Paper work faxed [phone]. Pending insurance authorization     3. HTN:  Beta Blocker: Start Carvedilol 3.125mg PO BID  RAASi: Start Entresto 24-26mg PO BID  CCB: D/C amlodipine  Other:    DASH Diet (to lower high blood pressure):  - Try to eat foods rich in potassium, calcium, magnesium, fiber, and protein  - Limit salt (sodium) intake to less than 1,500 mg per day  - Eat vegetables, fruits, and whole grains  - Include fat-free or low-fat dairy products, fish, poultry, beans, nuts, and vegetable oils  - Limit foods that are high in saturated or trans fat, such as fatty meats, full-fat dairy products, and tropical oils such as coconut, palm kernel, and palm oils  - Limit sugar-sweetened beverages and sweets    For more information: https://www.nhlbi.nih.gov/education/dash-eating-plan    4. HLD:  Goal Non-HDL < 100, LDL < 70 NOT AT GOAL  Lipid Panel: Lipid Profile (03.31.23 @ 10:22)    Cholesterol, Serum: 307 mg/dL    Triglycerides, Serum: 785: Lipemic. Interpret with caution mg/dL    HDL Cholesterol, Serum: 30 mg/dL    Non HDL Cholesterol: 277: Patients Atherosclerotic Cardiovascular Disease (ASCVD) Risk  Optimal Level (mg/dL)    LDL Cholesterol Calculated: Unable to Perform Calculation Due to high Triglyceride Cannot calculate due to Triglyceride is >400 mg/dL. mg/dL  Statin: Start Rosuvastatin 40mg PO QHS  Other: Start Tricor 48mg PO daily    All above discussed with Dr. Crowder

## 2023-03-31 NOTE — CHART NOTE - NSCHARTNOTEFT_GEN_A_CORE
Discussed with patient and his wife about importance of medication compliancy and wrist precautions post procedure.  Jeane ID [id number] used for interpretation.  Prescriptions for Brilinta 90mg PO BID x 90 days (3 refills) and Entresto 24-26mg Po BID x 90 days (3 refills) were sent to [facility] at [facility]. Per Pharmacist, prescriptions are covered with no cost.  Dr. Crowder requesting Life Vest for patient prior to discharge. All paperwork required was faxed to [employer] by both myself and Case management. Discussed with patient and his wife about importance of medication compliancy and wrist precautions post procedure.  Jeane ID [id number] used for interpretation.  Prescriptions for Brilinta 90mg PO BID x 90 days (3 refills) and Entresto 24-26mg Po BID x 90 days (3 refills) were sent to [facility] pharmacy at [facility]. Per Pharmacist, prescriptions are covered with no cost.  Dr. Crowder requesting Life Vest for patient prior to discharge. All paperwork required was faxed to [employer] by both myself and Case management. [phone] Discussed with patient and his wife about importance of medication compliancy and wrist precautions post procedure.  Jeane ID [id number] used for interpretation.  Prescriptions for Brilinta 90mg PO BID x 90 days (3 refills) and Entresto 24-26mg Po BID x 90 days (3 refills) were sent to [facility] pharmacy at [facility]. Per Pharmacist, prescriptions are covered with no cost.  Dr. Crowder requesting Life Vest for patient prior to discharge. All paperwork required was faxed to [employer] by both myself and Case management. FAX: [phone]  Voicemail was left for [employer]  at [phone].

## 2023-03-31 NOTE — H&P PST ADULT - NSICDXPASTMEDICALHX_GEN_ALL_CORE_FT
PAST MEDICAL HISTORY:  HTN (hypertension)      PAST MEDICAL HISTORY:  Cerebrovascular accident (CVA)     HTN (hypertension)

## 2023-04-01 VITALS
RESPIRATION RATE: 18 BRPM | HEART RATE: 81 BPM | OXYGEN SATURATION: 97 % | DIASTOLIC BLOOD PRESSURE: 75 MMHG | TEMPERATURE: 98 F | SYSTOLIC BLOOD PRESSURE: 113 MMHG

## 2023-04-01 LAB
A1C WITH ESTIMATED AVERAGE GLUCOSE RESULT: 10.9 % — HIGH (ref 4–5.6)
ALBUMIN SERPL ELPH-MCNC: 4 G/DL — SIGNIFICANT CHANGE UP (ref 3.3–5.2)
ALP SERPL-CCNC: 59 U/L — SIGNIFICANT CHANGE UP (ref 40–120)
ALT FLD-CCNC: 20 U/L — SIGNIFICANT CHANGE UP
ANION GAP SERPL CALC-SCNC: 16 MMOL/L — SIGNIFICANT CHANGE UP (ref 5–17)
AST SERPL-CCNC: 23 U/L — SIGNIFICANT CHANGE UP
BILIRUB SERPL-MCNC: 0.5 MG/DL — SIGNIFICANT CHANGE UP (ref 0.4–2)
BUN SERPL-MCNC: 16.1 MG/DL — SIGNIFICANT CHANGE UP (ref 8–20)
CALCIUM SERPL-MCNC: 8.4 MG/DL — SIGNIFICANT CHANGE UP (ref 8.4–10.5)
CHLORIDE SERPL-SCNC: 100 MMOL/L — SIGNIFICANT CHANGE UP (ref 96–108)
CO2 SERPL-SCNC: 19 MMOL/L — LOW (ref 22–29)
CREAT SERPL-MCNC: 0.87 MG/DL — SIGNIFICANT CHANGE UP (ref 0.5–1.3)
EGFR: 103 ML/MIN/1.73M2 — SIGNIFICANT CHANGE UP
ESTIMATED AVERAGE GLUCOSE: 266 MG/DL — HIGH (ref 68–114)
GLUCOSE SERPL-MCNC: 224 MG/DL — HIGH (ref 70–99)
HCT VFR BLD CALC: 46.3 % — SIGNIFICANT CHANGE UP (ref 39–50)
HGB BLD-MCNC: 15.8 G/DL — SIGNIFICANT CHANGE UP (ref 13–17)
MAGNESIUM SERPL-MCNC: 1.8 MG/DL — SIGNIFICANT CHANGE UP (ref 1.6–2.6)
MCHC RBC-ENTMCNC: 28.5 PG — SIGNIFICANT CHANGE UP (ref 27–34)
MCHC RBC-ENTMCNC: 34.1 GM/DL — SIGNIFICANT CHANGE UP (ref 32–36)
MCV RBC AUTO: 83.4 FL — SIGNIFICANT CHANGE UP (ref 80–100)
PLATELET # BLD AUTO: 142 K/UL — LOW (ref 150–400)
POTASSIUM SERPL-MCNC: 4.2 MMOL/L — SIGNIFICANT CHANGE UP (ref 3.5–5.3)
POTASSIUM SERPL-SCNC: 4.2 MMOL/L — SIGNIFICANT CHANGE UP (ref 3.5–5.3)
PROT SERPL-MCNC: 6.8 G/DL — SIGNIFICANT CHANGE UP (ref 6.6–8.7)
RBC # BLD: 5.55 M/UL — SIGNIFICANT CHANGE UP (ref 4.2–5.8)
RBC # FLD: 13.2 % — SIGNIFICANT CHANGE UP (ref 10.3–14.5)
SODIUM SERPL-SCNC: 135 MMOL/L — SIGNIFICANT CHANGE UP (ref 135–145)
WBC # BLD: 6.97 K/UL — SIGNIFICANT CHANGE UP (ref 3.8–10.5)
WBC # FLD AUTO: 6.97 K/UL — SIGNIFICANT CHANGE UP (ref 3.8–10.5)

## 2023-04-01 PROCEDURE — C1887: CPT

## 2023-04-01 PROCEDURE — C1753: CPT

## 2023-04-01 PROCEDURE — C9600: CPT | Mod: LC

## 2023-04-01 PROCEDURE — 85027 COMPLETE CBC AUTOMATED: CPT

## 2023-04-01 PROCEDURE — C1874: CPT

## 2023-04-01 PROCEDURE — C9601: CPT | Mod: LC

## 2023-04-01 PROCEDURE — 80048 BASIC METABOLIC PNL TOTAL CA: CPT

## 2023-04-01 PROCEDURE — 80053 COMPREHEN METABOLIC PANEL: CPT

## 2023-04-01 PROCEDURE — 93005 ELECTROCARDIOGRAM TRACING: CPT

## 2023-04-01 PROCEDURE — 83036 HEMOGLOBIN GLYCOSYLATED A1C: CPT

## 2023-04-01 PROCEDURE — 85025 COMPLETE CBC W/AUTO DIFF WBC: CPT

## 2023-04-01 PROCEDURE — 83735 ASSAY OF MAGNESIUM: CPT

## 2023-04-01 PROCEDURE — C1769: CPT

## 2023-04-01 PROCEDURE — 80061 LIPID PANEL: CPT

## 2023-04-01 PROCEDURE — 93010 ELECTROCARDIOGRAM REPORT: CPT | Mod: 76

## 2023-04-01 PROCEDURE — 93458 L HRT ARTERY/VENTRICLE ANGIO: CPT | Mod: 59

## 2023-04-01 PROCEDURE — C1894: CPT

## 2023-04-01 PROCEDURE — C1725: CPT

## 2023-04-01 PROCEDURE — 36415 COLL VENOUS BLD VENIPUNCTURE: CPT

## 2023-04-01 RX ORDER — ROSUVASTATIN CALCIUM 5 MG/1
1 TABLET ORAL
Qty: 90 | Refills: 1
Start: 2023-04-01

## 2023-04-01 RX ORDER — FENOFIBRATE,MICRONIZED 130 MG
1 CAPSULE ORAL
Qty: 90 | Refills: 2
Start: 2023-04-01

## 2023-04-01 RX ORDER — CARVEDILOL PHOSPHATE 80 MG/1
1 CAPSULE, EXTENDED RELEASE ORAL
Qty: 90 | Refills: 1
Start: 2023-04-01

## 2023-04-01 RX ORDER — AMLODIPINE BESYLATE 2.5 MG/1
1 TABLET ORAL
Refills: 0 | DISCHARGE

## 2023-04-01 RX ADMIN — TICAGRELOR 90 MILLIGRAM(S): 90 TABLET ORAL at 09:10

## 2023-04-01 RX ADMIN — CARVEDILOL PHOSPHATE 3.12 MILLIGRAM(S): 80 CAPSULE, EXTENDED RELEASE ORAL at 05:08

## 2023-04-01 RX ADMIN — Medication 48 MILLIGRAM(S): at 09:10

## 2023-04-01 RX ADMIN — Medication 81 MILLIGRAM(S): at 09:09

## 2023-04-01 RX ADMIN — SACUBITRIL AND VALSARTAN 1 TABLET(S): 24; 26 TABLET, FILM COATED ORAL at 05:08

## 2023-04-01 NOTE — PROGRESS NOTE ADULT - SUBJECTIVE AND OBJECTIVE BOX
[facility], THE HEART [facility]ENTER                              [address] [place] [address]                                                 PHONE: [phone]                                                 FAX: [phone]  -----------------------------------------------------------------------------------------------  Pt seen and examined. FU for CAD    Overnight events/Complaints: Pt without complains.    Vital Signs Last 24 Hrs  T(C): 36.5 (01 Apr 2023 08:15), Max: 36.7 (01 Apr 2023 00:15)  T(F): 97.7 (01 Apr 2023 08:15), Max: 98 (01 Apr 2023 00:15)  HR: 68 (01 Apr 2023 08:15) (65 - 73)  BP: 124/85 (01 Apr 2023 08:15) (120/70 - 162/92)  BP(mean): --  RR: 18 (01 Apr 2023 08:15) (15 - 19)  SpO2: 98% (01 Apr 2023 08:15) (95% - 99%)    Parameters below as of 01 Apr 2023 04:30  Patient On (Oxygen Delivery Method): room air      I&O's Summary    31 Mar 2023 07:01  -  01 Apr 2023 07:00  --------------------------------------------------------  IN: 0 mL / OUT: 900 mL / NET: -900 mL    PHYSICAL EXAM:  Constitutional: Appears well; alert and co-operative  Eyes: Conjunctiva normal, No scleral icterus  Neck: Supple, No JVD  Cardiovascular: regular S1, S2  Respiratory: Lungs clear to auscultation; no crepitations, no wheeze  Gastrointestinal:  Soft, Non-tender, + BS	  Musculoskeletal: No edema        LABS:                        15.8   6.97  )-----------( 142      ( 01 Apr 2023 04:45 )             46.3     04-01    135  |  100  |  16.1  ----------------------------<  224<H>  4.2   |  19.0<L>  |  0.87    Ca    8.4      01 Apr 2023 04:45  Mg     1.8     04-01    TPro  6.8  /  Alb  4.0  /  TBili  0.5  /  DBili  x   /  AST  23  /  ALT  20  /  AlkPhos  59  04-01    RADIOLOGY & ADDITIONAL STUDIES: (reviewed)  CXR was independently visualized/reviewed  and demonstrated: clear lungs    CARDIOLOGY TESTING:(reviewed)     CARDIAC CATH:  Conclusions:   Severe LCX/OM disease with non obstructive CAD of LAD and RCA.  The  LCX/OM was treated with PTCA and STENT    (MERI-Xience) with IVUS (mini crush technique)     TELEMETRY independently visualized/reviewed and demonstrated : NSR      MEDICATIONS:(reviewed)  MEDICATIONS  (STANDING):  aspirin  chewable 81 milliGRAM(s) Oral daily  carvedilol 3.125 milliGRAM(s) Oral every 12 hours  chlorhexidine 4% Liquid 1 Application(s) Topical Once  fenofibrate Tablet 48 milliGRAM(s) Oral daily  rosuvastatin 40 milliGRAM(s) Oral at bedtime  sacubitril 24 mG/valsartan 26 mG 1 Tablet(s) Oral two times a day  ticagrelor 90 milliGRAM(s) Oral every 12 hours      ASSESSMENT AND PLAN:    52M PMH HTN; patient reports hx CVA 6 years ago in [place] (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); family hx CAD (father passed at 70; mother passed at 65; cousin passed at 60) with complaints of SOB  s/p cath with Severe LCX/OM disease with non obstructive CAD of LAD and RCA    CAD  - Continue ASA and Brilinta    HTN  - Continue Entresto, Coreg    Dyslipidemia  - Continue statin    Assure WCD on discharge                                                    [facility], [facility]                              [address] [place] [address]                                                 PHONE: [phone]                                                 FAX: [phone]  -----------------------------------------------------------------------------------------------  Pt seen and examined. FU for CAD    Overnight events/Complaints: Pt without complains.    Vital Signs Last 24 Hrs  T(C): 36.5 (01 Apr 2023 08:15), Max: 36.7 (01 Apr 2023 00:15)  T(F): 97.7 (01 Apr 2023 08:15), Max: 98 (01 Apr 2023 00:15)  HR: 68 (01 Apr 2023 08:15) (65 - 73)  BP: 124/85 (01 Apr 2023 08:15) (120/70 - 162/92)  BP(mean): --  RR: 18 (01 Apr 2023 08:15) (15 - 19)  SpO2: 98% (01 Apr 2023 08:15) (95% - 99%)    Parameters below as of 01 Apr 2023 04:30  Patient On (Oxygen Delivery Method): room air      I&O's Summary    31 Mar 2023 07:01  -  01 Apr 2023 07:00  --------------------------------------------------------  IN: 0 mL / OUT: 900 mL / NET: -900 mL    PHYSICAL EXAM:  Constitutional: Appears well; alert and co-operative  Eyes: Conjunctiva normal, No scleral icterus  Neck: Supple, No JVD  Cardiovascular: regular S1, S2  Respiratory: Lungs clear to auscultation; no crepitations, no wheeze  Gastrointestinal:  Soft, Non-tender, + BS	  Musculoskeletal: No edema        LABS:                        15.8   6.97  )-----------( 142      ( 01 Apr 2023 04:45 )             46.3     04-01    135  |  100  |  16.1  ----------------------------<  224<H>  4.2   |  19.0<L>  |  0.87    Ca    8.4      01 Apr 2023 04:45  Mg     1.8     04-01    TPro  6.8  /  Alb  4.0  /  TBili  0.5  /  DBili  x   /  AST  23  /  ALT  20  /  AlkPhos  59  04-01    RADIOLOGY & ADDITIONAL STUDIES: (reviewed)  CXR was independently visualized/reviewed  and demonstrated: clear lungs    CARDIOLOGY TESTING:(reviewed)     CARDIAC CATH:  Conclusions:   Severe LCX/OM disease with non obstructive CAD of LAD and RCA.  The  LCX/OM was treated with PTCA and STENT    (MERI-Xience) with IVUS (mini crush technique)     TTE 3/8/23 revealing EF 30-35%; moderate diffuse hypocontractility of the LV; akinesis of the entire lateral, inferolateral and inferior myocardium; RV ventricular systolic function is normal; mitral valve leaflets are mildly thickened; moderate 2-3+ MVR; mildly thickened AV leaflets; mild 1+ AVR; trace TVR; mild +1 PVR.    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

## 2023-04-01 NOTE — DISCHARGE NOTE NURSING/CASE MANAGEMENT/SOCIAL WORK - PATIENT PORTAL LINK FT
You can access the FollowMyHealth Patient Portal offered by [facility] by registering at the following website: [email or web address]. By joining [employer]’s FollowMyHealth portal, you will also be able to view your health information using other applications (apps) compatible with our system.

## 2023-04-01 NOTE — PROGRESS NOTE ADULT - SUBJECTIVE AND OBJECTIVE BOX
Department of Cardiology                                                                  [facility] [address] [address]                                                            Telephone: [phone]. Fax:[phone]                                                                             Progress Note      HPI:  52M PMH HTN; family hx CAD (father passed at 70; mother passed at 65; cousin passed at 60) with complaints of SOB, worsened when lifting heavy  boxes at work. He denies chest pain, palpitations, orthopnea or PND. He is unable to exercise of a treadmill due to back pain. He was told his BP was elevated in [place], however it was reportedly stable at his PMD's office. He had a TTE 3/8/23 revealing EF 30-35%; moderate diffuse hypocontractility of the LV; akinesis of the entire lateral, inferolateral and inferior myocardium; RV ventricular systolic function is normal; mitral valve leaflets are mildly thickened; moderate 2-3+ MVR; mildly thickened AV leaflets; mild 1+ AVR; trace TVR; mild +1 PVR. He underwent a PET/ CT myocardial perfusion scan 3/23/23 revealing negative stress ekg; During stress global systolic function is severely reduced EF 20%; at rest EF calculated at 20%; small area of mild ischemia involving the mid to basal inferior and inferolateral wall; coronary calcification is noted involving the Cx. Patient presents to [facility] for elective LHC +/- PCI.      MEDICATIONS:  carvedilol 3.125 milliGRAM(s) Oral every 12 hours  sacubitril 24 mG/valsartan 26 mG 1 Tablet(s) Oral two times a day  fenofibrate Tablet 48 milliGRAM(s) Oral daily  rosuvastatin 40 milliGRAM(s) Oral at bedtime  aspirin  chewable 81 milliGRAM(s) Oral daily  chlorhexidine 4% Liquid 1 Application(s) Topical Once  ticagrelor 90 milliGRAM(s) Oral every 12 hours        ROS: as stated above, otherwise negative    PHYSICAL EXAM:  Constitutional: A & O x 3  HEENT:   Normal oral mucosa, PERRL, EOMI	  Cardiovascular: Normal S1 S2, No JVD, No murmurs, No edema  Respiratory: Lungs clear to auscultation	  Gastrointestinal:  Soft, Non-tender, + BS	  Skin: No rashes, No ecchymoses, No cyanosis  Neurologic: Non-focal  Extremities: Normal range of motion, No clubbing, cyanosis or edema  Vascular: Peripheral pulses palpable 2+ bilaterally  Site check: Right radial dressing removed.  No hematoma/ecchymosis/bleeding.  Site mildly edematous.  + palp radial pulse      T(C): 36.5 (04-01-23 @ 08:15), Max: 36.7 (04-01-23 @ 00:15)  HR: 68 (04-01-23 @ 08:15) (65 - 73)  BP: 124/85 (04-01-23 @ 08:15) (120/70 - 162/92)  RR: 18 (04-01-23 @ 08:15) (15 - 19)  SpO2: 98% (04-01-23 @ 08:15) (95% - 99%)  Wt(kg): --          TELEMETRY: 	  SB/SR    ECG:  	    LABS:	 	                          15.8   6.97  )-----------( 142      ( 01 Apr 2023 04:45 )             46.3     04-01    135  |  100  |  16.1  ----------------------------<  224<H>  4.2   |  19.0<L>  |  0.87    Ca    8.4      01 Apr 2023 04:45  Mg     1.8     04-01    TPro  6.8  /  Alb  4.0  /  TBili  0.5  /  DBili  x   /  AST  23  /  ALT  20  /  AlkPhos  59  04-01

## 2023-04-01 NOTE — PROGRESS NOTE ADULT - ASSESSMENT
POD #1 S/P: Severe LCX/OM disease with non obstructive CAD of LAD and RCA.  The LCX/OM was treated with PTCA and STENT (MERI-Xience) with IVUS (mini crush technique).   First obtuse marginal: The initial stenosis was 90 %. s/p MERI x 1 (XIENCE SKYPOINT 2.75 X 28MM).  Mid circumflex: The initial stenosis was 85 %. s/p MERI x 1 (XIENCE SKYPOINT 3X 23MM)  Proximal circumflex: The initial stenosis was 80 % s/p 1 MERI (XIENCE SKYPOINT 4.0 X 28MM)    -Pt to have life vest prior to discharge today  -Dual anti platelet therapy with aspirin 81mg and Brilinta 90mg BID  -Start Carvedilol 3.125mg PO BID  -D/C home amlodipine  -start Entresto 24-26mg Po BID  -Start Rosuvastatin 40mg PO QHS  -Start Tricor 48mg daily  -F/U Dr. Crowder  -Spoke to pt through  of above  -Discharge to home when life vest obtained

## 2023-04-01 NOTE — DISCHARGE NOTE NURSING/CASE MANAGEMENT/SOCIAL WORK - NSDCPEFALRISK_GEN_ALL_CORE
For information on Fall & Injury Prevention, visit: [email or web address] OR  [email or web address] OR  https://www.cdc.gov/steadi/patient.html
As certified below, I, or a nurse practitioner or physician assistant working with me, had a face-[email or web address] encounter that meets the physician face-to-face encounter requirements.